# Patient Record
Sex: FEMALE | Race: WHITE | NOT HISPANIC OR LATINO | Employment: STUDENT | URBAN - METROPOLITAN AREA
[De-identification: names, ages, dates, MRNs, and addresses within clinical notes are randomized per-mention and may not be internally consistent; named-entity substitution may affect disease eponyms.]

---
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
## 2021-01-12 LAB
ABO GROUP BLD: NORMAL
D AG BLD QL: POSITIVE
EXTERNAL ANTIBODY SCREEN: NEGATIVE
EXTERNAL HEPATITIS B SURFACE ANTIGEN: NONREACTIVE
EXTERNAL HIV 1+2 AB/P24 AG SERUM: NONREACTIVE
EXTERNAL RUBELLA ANTIBODY, IGG: NORMAL

## 2021-05-25 LAB — EXTERNAL GROUP B STREP DNA: POSITIVE

## 2021-08-24 LAB
EXTERNAL CHLAMYDIA TRACHOMATIS DNA PROBE: POSITIVE
EXTERNAL NEISSERIA GONORRHOEAE DNA PROBE: POSITIVE

## 2021-09-06 ENCOUNTER — HOSPITAL ENCOUNTER (INPATIENT)
Facility: HOSPITAL | Age: 21
LOS: 2 days | Discharge: HOME | DRG: 776 | End: 2021-09-08
Attending: OBSTETRICS & GYNECOLOGY | Admitting: OBSTETRICS & GYNECOLOGY
Payer: COMMERCIAL

## 2021-09-06 DIAGNOSIS — Z3A.37 37 WEEKS GESTATION OF PREGNANCY: Primary | ICD-10-CM

## 2021-09-06 PROBLEM — B00.9 HERPES VIRUS INFECTION IN MOTHER DURING THIRD TRIMESTER OF PREGNANCY: Status: ACTIVE | Noted: 2021-09-06

## 2021-09-06 PROBLEM — O98.513 HERPES VIRUS INFECTION IN MOTHER DURING THIRD TRIMESTER OF PREGNANCY: Status: ACTIVE | Noted: 2021-09-06

## 2021-09-06 LAB
ABO GROUP (TYPE) IN BLOOD: NORMAL
ANTIBODY SCREEN: NORMAL
BASOPHILS # BLD AUTO: 0.1 10*3/UL (ref 0–0.1)
BASOPHILS NFR BLD AUTO: 0 % (ref 0–2)
D AG BLD QL: NORMAL
EOSINOPHIL # BLD AUTO: 0 10*3/UL (ref 0.1–0.2)
EOSINOPHIL NFR BLD AUTO: 0 % (ref 0–3)
ERYTHROCYTE [DISTWIDTH] IN BLOOD BY AUTOMATED COUNT: 16.5 % (ref 11.5–14)
ERYTHROCYTE [DISTWIDTH] IN BLOOD BY AUTOMATED COUNT: 16.5 % (ref 11.5–14)
HCT VFR BLD AUTO: 36.4 % (ref 34–45)
HCT VFR BLD AUTO: 36.7 % (ref 34–45)
HGB BLD-MCNC: 12 G/DL (ref 11.5–15.5)
HGB BLD-MCNC: 12.1 G/DL (ref 11.5–15.5)
LYMPHOCYTES # BLD AUTO: 2.2 10*3/UL (ref 1–3.2)
LYMPHOCYTES NFR BLD AUTO: 14 % (ref 11–47)
MCH RBC QN AUTO: 29.3 PG (ref 28–33)
MCH RBC QN AUTO: 29.5 PG (ref 28–33)
MCHC RBC AUTO-ENTMCNC: 33 G/DL (ref 32–36)
MCHC RBC AUTO-ENTMCNC: 33.1 G/DL (ref 32–36)
MCV RBC AUTO: 88.6 FL (ref 81–97)
MCV RBC AUTO: 89.2 FL (ref 81–97)
MONOCYTES # BLD AUTO: 0.5 10*3/UL (ref 0.2–0.8)
MONOCYTES NFR BLD AUTO: 3 % (ref 3–11)
NEUTROPHILS # BLD AUTO: 13.4 10*3/UL (ref 2–7.4)
NEUTROPHILS NFR BLD AUTO: 83 % (ref 41–81)
PLATELET # BLD AUTO: 267 10*3/UL (ref 140–350)
PLATELET # BLD AUTO: 270 10*3/UL (ref 140–350)
PMV BLD AUTO: 8.2 FL (ref 6.9–10.8)
PMV BLD AUTO: 9.2 FL (ref 6.9–10.8)
RBC # BLD AUTO: 4.08 10*6/ΜL (ref 3.7–5.3)
RBC # BLD AUTO: 4.14 10*6/ΜL (ref 3.7–5.3)
SECOND/CONFIRMATORY ABORH PERFORMED: NORMAL
WBC # BLD AUTO: 16.2 10*3/UL (ref 4.5–10.5)
WBC # BLD AUTO: 16.4 10*3/UL (ref 4.5–10.5)

## 2021-09-06 PROCEDURE — 85025 COMPLETE CBC W/AUTO DIFF WBC: CPT | Performed by: OBSTETRICS & GYNECOLOGY

## 2021-09-06 PROCEDURE — 36415 COLL VENOUS BLD VENIPUNCTURE: CPT | Performed by: OBSTETRICS & GYNECOLOGY

## 2021-09-06 PROCEDURE — (BLANK) HC ROOM PRIVATE OBSTETRICS

## 2021-09-06 PROCEDURE — 85027 COMPLETE CBC AUTOMATED: CPT | Performed by: OBSTETRICS & GYNECOLOGY

## 2021-09-06 PROCEDURE — 2590000100 HC RX 259: Performed by: OBSTETRICS & GYNECOLOGY

## 2021-09-06 PROCEDURE — 6370000100 HC RX 637 (ALT 250 FOR IP): Performed by: OBSTETRICS & GYNECOLOGY

## 2021-09-06 PROCEDURE — 86901 BLOOD TYPING SEROLOGIC RH(D): CPT

## 2021-09-06 RX ORDER — FOLIC ACID/MULTIVIT,IRON,MINER 0.4MG-18MG
1 TABLET ORAL DAILY
Status: DISCONTINUED | OUTPATIENT
Start: 2021-09-06 | End: 2021-09-08 | Stop reason: HOSPADM

## 2021-09-06 RX ORDER — CALCIUM CARBONATE 200(500)MG
750 TABLET,CHEWABLE ORAL 3 TIMES DAILY PRN
Status: DISCONTINUED | OUTPATIENT
Start: 2021-09-06 | End: 2021-09-08 | Stop reason: HOSPADM

## 2021-09-06 RX ORDER — SERTRALINE HYDROCHLORIDE 50 MG/1
50 TABLET, FILM COATED ORAL DAILY
Status: DISCONTINUED | OUTPATIENT
Start: 2021-09-06 | End: 2021-09-08 | Stop reason: HOSPADM

## 2021-09-06 RX ORDER — DOCUSATE SODIUM 100 MG/1
100 CAPSULE, LIQUID FILLED ORAL 2 TIMES DAILY
Status: DISCONTINUED | OUTPATIENT
Start: 2021-09-06 | End: 2021-09-08 | Stop reason: HOSPADM

## 2021-09-06 RX ORDER — VALACYCLOVIR HYDROCHLORIDE 500 MG/1
500 TABLET, FILM COATED ORAL 2 TIMES DAILY
Status: DISCONTINUED | OUTPATIENT
Start: 2021-09-06 | End: 2021-09-08 | Stop reason: HOSPADM

## 2021-09-06 RX ORDER — ACETAMINOPHEN 500 MG
500-1000 TABLET ORAL EVERY 4 HOURS PRN
Status: DISCONTINUED | OUTPATIENT
Start: 2021-09-06 | End: 2021-09-08 | Stop reason: HOSPADM

## 2021-09-06 RX ORDER — SERTRALINE HYDROCHLORIDE 50 MG/1
50 TABLET, FILM COATED ORAL DAILY
COMMUNITY

## 2021-09-06 RX ORDER — VALACYCLOVIR HYDROCHLORIDE 500 MG/1
500 TABLET, FILM COATED ORAL 2 TIMES DAILY
COMMUNITY

## 2021-09-06 RX ORDER — IBUPROFEN 800 MG/1
800 TABLET ORAL EVERY 8 HOURS SCHEDULED
Status: DISCONTINUED | OUTPATIENT
Start: 2021-09-06 | End: 2021-09-08 | Stop reason: HOSPADM

## 2021-09-06 RX ADMIN — SERTRALINE 50 MG: 50 TABLET, FILM COATED ORAL at 15:13

## 2021-09-06 RX ADMIN — IBUPROFEN 800 MG: 800 TABLET, FILM COATED ORAL at 15:13

## 2021-09-06 RX ADMIN — DOCUSATE SODIUM 100 MG: 100 CAPSULE, LIQUID FILLED ORAL at 15:13

## 2021-09-06 RX ADMIN — Medication 1 TABLET: at 15:13

## 2021-09-06 RX ADMIN — IBUPROFEN 800 MG: 800 TABLET, FILM COATED ORAL at 23:20

## 2021-09-06 RX ADMIN — VALACYCLOVIR HYDROCHLORIDE 500 MG: 500 TABLET, FILM COATED ORAL at 15:12

## 2021-09-06 RX ADMIN — DOCUSATE SODIUM 100 MG: 100 CAPSULE, LIQUID FILLED ORAL at 23:20

## 2021-09-07 PROBLEM — A54.9: Status: ACTIVE | Noted: 2021-09-07

## 2021-09-07 PROBLEM — A74.9 CHLAMYDIA: Status: ACTIVE | Noted: 2021-09-07

## 2021-09-07 LAB
BASOPHILS # BLD AUTO: 0.1 10*3/UL (ref 0–0.1)
BASOPHILS NFR BLD AUTO: 1 % (ref 0–2)
C TRACH DNA SPEC QL NAA+PROBE: POSITIVE
EOSINOPHIL # BLD AUTO: 0.1 10*3/UL (ref 0.1–0.2)
EOSINOPHIL NFR BLD AUTO: 1 % (ref 0–3)
ERYTHROCYTE [DISTWIDTH] IN BLOOD BY AUTOMATED COUNT: 16.5 % (ref 11.5–14)
HCT VFR BLD AUTO: 33.3 % (ref 34–45)
HGB BLD-MCNC: 11.1 G/DL (ref 11.5–15.5)
LYMPHOCYTES # BLD AUTO: 3.2 10*3/UL (ref 1–3.2)
LYMPHOCYTES NFR BLD AUTO: 25 % (ref 11–47)
MCH RBC QN AUTO: 29.5 PG (ref 28–33)
MCHC RBC AUTO-ENTMCNC: 33.3 G/DL (ref 32–36)
MCV RBC AUTO: 88.8 FL (ref 81–97)
MONOCYTES # BLD AUTO: 0.7 10*3/UL (ref 0.2–0.8)
MONOCYTES NFR BLD AUTO: 5 % (ref 3–11)
N GONORRHOEA DNA SPEC QL NAA+PROBE: POSITIVE
NEUTROPHILS # BLD AUTO: 8.7 10*3/UL (ref 2–7.4)
NEUTROPHILS NFR BLD AUTO: 68 % (ref 41–81)
PLATELET # BLD AUTO: 236 10*3/UL (ref 140–350)
PMV BLD AUTO: 8.2 FL (ref 6.9–10.8)
RBC # BLD AUTO: 3.74 10*6/ΜL (ref 3.7–5.3)
WBC # BLD AUTO: 12.8 10*3/UL (ref 4.5–10.5)

## 2021-09-07 PROCEDURE — 6370000100 HC RX 637 (ALT 250 FOR IP): Performed by: OBSTETRICS & GYNECOLOGY

## 2021-09-07 PROCEDURE — 87591 N.GONORRHOEAE DNA AMP PROB: CPT | Performed by: OBSTETRICS & GYNECOLOGY

## 2021-09-07 PROCEDURE — (BLANK) HC ROOM PRIVATE OBSTETRICS

## 2021-09-07 PROCEDURE — 87491 CHLMYD TRACH DNA AMP PROBE: CPT | Performed by: OBSTETRICS & GYNECOLOGY

## 2021-09-07 PROCEDURE — 2590000100 HC RX 259: Performed by: OBSTETRICS & GYNECOLOGY

## 2021-09-07 PROCEDURE — 6360000200 HC RX 636 W HCPCS (ALT 250 FOR IP): Performed by: OBSTETRICS & GYNECOLOGY

## 2021-09-07 PROCEDURE — 85025 COMPLETE CBC W/AUTO DIFF WBC: CPT | Performed by: OBSTETRICS & GYNECOLOGY

## 2021-09-07 RX ORDER — AZITHROMYCIN 250 MG/1
1000 TABLET, FILM COATED ORAL DAILY
Status: DISCONTINUED | OUTPATIENT
Start: 2021-09-07 | End: 2021-09-08 | Stop reason: HOSPADM

## 2021-09-07 RX ADMIN — AZITHROMYCIN MONOHYDRATE 1000 MG: 250 TABLET ORAL at 12:33

## 2021-09-07 RX ADMIN — DOCUSATE SODIUM 100 MG: 100 CAPSULE, LIQUID FILLED ORAL at 09:00

## 2021-09-07 RX ADMIN — DOCUSATE SODIUM 100 MG: 100 CAPSULE, LIQUID FILLED ORAL at 20:09

## 2021-09-07 RX ADMIN — IBUPROFEN 800 MG: 800 TABLET, FILM COATED ORAL at 17:22

## 2021-09-07 RX ADMIN — IBUPROFEN 800 MG: 800 TABLET, FILM COATED ORAL at 08:32

## 2021-09-07 RX ADMIN — VALACYCLOVIR HYDROCHLORIDE 500 MG: 500 TABLET, FILM COATED ORAL at 20:09

## 2021-09-07 RX ADMIN — SERTRALINE 50 MG: 50 TABLET, FILM COATED ORAL at 08:32

## 2021-09-07 RX ADMIN — CEFTRIAXONE SODIUM 250 MG: 1 INJECTION, POWDER, FOR SOLUTION INTRAMUSCULAR; INTRAVENOUS at 12:35

## 2021-09-07 RX ADMIN — Medication 1 TABLET: at 08:32

## 2021-09-07 NOTE — PLAN OF CARE
Problem: Pain - Adult  Goal: Verbalizes/displays adequate comfort level or baseline comfort level  Description: INTERVENTIONS:  1. Encourage patient to monitor pain and request interventions  2. Assess pain using the appropriate pain scale  3. Administer analgesics based on type and severity of pain and evaluate response  4. Educate/Implement non-pharmacological measures as appropriate and evaluate response  5. Consider cultural, developmental and social influences on pain and pain management  6. Notify Provider if interventions unsuccessful or patient reports new pain  9/6/2021 1952 by Charles Bryan RN  Outcome: Progressing  Flowsheets (Taken 9/6/2021 1821 by Samara Cain RN)  Verbalizes/displays adequate comfort level or baseline comfort level:   Encourage patient to monitor pain and request interventions   Assess pain using the appropriate pain scale   Administer analgesics based on type and severity of pain and evaluate response   Educate/Implement non-pharmacological measures as appropriate and evaluate response  9/6/2021 1952 by Charles Bryan RN  Outcome: Progressing     Problem: Infection - Adult  Goal: Absence of infection during hospitalization  Description: INTERVENTIONS:  1. Assess and monitor for signs and symptoms of infection  2. Monitor lab/diagnostic results  3. Monitor all insertion sites/wounds/incisions  4. Monitor secretions for changes in amount and color  5. Administer medications as ordered  6. Educate and encourage patient and family to use good hand hygiene technique  7. Identify and educate in appropriate isolation precautions for identified infection/condition  9/6/2021 1952 by Charles Bryan RN  Outcome: Progressing  Flowsheets (Taken 9/6/2021 1821 by Samara Cain RN)  Absence of infection during hospitalization:   Assess and monitor for signs and symptoms of infection   Monitor lab/diagnostic results   Monitor all insertion sites/wounds/incisions  9/6/2021 1952 by Charles  SHANNON Bryan  Outcome: Progressing     Problem: Safety Adult - Fall  Goal: Free from fall injury  Description: INTERVENTIONS:    Inpatient - Please reference Cares/Safety Flowsheet under Linda Fall Risk for interventions.  Pediatrics - Please reference Peds Daily Cares/Safety Flowsheet under Franco Pediatric Fall Assessment Fall Bundle for interventions  LD/OB - Please reference OB Shift Screening Flowsheet under OB Fall Risk for interventions.  9/6/2021 1952 by Charles Bryan RN  Outcome: Progressing  9/6/2021 1952 by Charles Bryan RN  Outcome: Progressing     Problem: Discharge Planning  Goal: Discharge to home or other facility with appropriate resources  Description: INTERVENTIONS:  1. Identify and discuss barriers to discharge with patient and caregiver.  2. Arrange for needed discharge resources and transportation as appropriate.  3. Identify discharge learning needs (meds, wound care, etc).  4. Arrange for interpreters to assist at discharge as needed.  5. Refer to  for coordinating discharge planning if the patient needs post-hospital services based on physician order or complex needs related to functional status, cognitive ability or social support system.  9/6/2021 1952 by Charles Bryan RN  Outcome: Progressing  Flowsheets (Taken 9/6/2021 1821 by Samara Cain RN)  Discharge to home or other facility with appropriate resources: Identify and discuss barriers to discharge with patient and caregiver  9/6/2021 1952 by Charles Bryan RN  Outcome: Progressing     Problem: Alteration in the Breast  Description: i.e. Inverted or flat nipples, breast augmentation, breast reduction, etc.  Goal: Optimize infant feeding at the breast  Description: INTERVENTIONS:  1. Maximize feeding opportunities (skin to skin)  2. Assess postioning, latch, gestational age and oral anatomy  3. Assess prior breastfeeding history  4. Discourage use of pacifiers - artifical nipples  5. Educate mother on  feeding cues and signs of proper latch  6. Provide latch assistance  7. Finger feeding/hand expression of EBM or sucrose to elicit suck  8. Symphony/[place] pump use  9. Order lactation consult  10. Nipple shield  11. Supplementation (EBM/formula) via finger feed  12. Supplementation (EBM/formula) via bottle  13. Supplementation (EBM/formula) via SNS  14. Treat damaged nipples  15. Provide and reinforce breastfeeding education  9/6/2021 1952 by Charles Bryan RN  Outcome: Progressing  Flowsheets (Taken 9/6/2021 1821 by Samara Cain RN)  Optimize infant feeding at the breast:   Maximize feeding opportunities (skin to skin)   Assess prior breastfeeding history   Educate mother on feeding cues and signs of proper latch   Order lactation consult   Discourage use of pacifiers - artificial nipples   Assess positioning,latch,gestational age and oral anatomy  9/6/2021 1952 by Charles Bryan RN  Outcome: Progressing     Problem: Inadequate Latch, Suck, or Swallow  Goal: Optimize infant feeding at the breast  Description: INTERVENTIONS:  1. Maximize feeding opportunities (skin to skin)  2. Assess postioning, latch, gestational age and oral anatomy  3. Assess prior breastfeeding history  4. Discourage use of pacifiers - artifical nipples  5. Educate mother on feeding cues and signs of proper latch  6. Provide latch assistance  7. Finger feeding/hand expression of EBM or sucrose to elicit suck  8. Symphony/[place] pump use  9. Order lactation consult  10. Nipple shield  11. Supplementation (EBM/formula) via finger feed  12. Supplementation (EBM/formula) via bottle  13. Supplementation (EBM/formula) via SNS  14. Treat damaged nipples  15. Provide and reinforce breastfeeding education  9/6/2021 1952 by Charles Bryan RN  Outcome: Progressing  Flowsheets (Taken 9/6/2021 1821 by Samara Cain RN)  Optimize infant feeding at the breast:   Maximize feeding opportunities (skin to skin)   Assess prior breastfeeding  history   Educate mother on feeding cues and signs of proper latch   Order lactation consult   Discourage use of pacifiers - artificial nipples   Assess positioning,latch,gestational age and oral anatomy  9/6/2021 1952 by Charles Bryan RN  Outcome: Progressing     Problem: Lactation Knowledge Deficit  Goal: Optimize infant feeding at the breast  Description: INTERVENTIONS:  1. Maximize feeding opportunities (skin to skin)  2. Assess postioning, latch, gestational age and oral anatomy  3. Assess prior breastfeeding history  4. Discourage use of pacifiers - artifical nipples  5. Educate mother on feeding cues and signs of proper latch  6. Provide latch assistance  7. Finger feeding/hand expression of EBM or sucrose to elicit suck  8. Symphony/[place] pump use  9. Order lactation consult  10. Nipple shield  11. Supplementation (EBM/formula) via finger feed  12. Supplementation (EBM/formula) via bottle  13. Supplementation (EBM/formula) via SNS  14. Treat damaged nipples  15. Provide and reinforce breastfeeding education  9/6/2021 1952 by Charles Bryan RN  Outcome: Progressing  Flowsheets (Taken 9/6/2021 1821 by Samara Cain RN)  Optimize infant feeding at the breast:   Maximize feeding opportunities (skin to skin)   Assess prior breastfeeding history   Educate mother on feeding cues and signs of proper latch   Order lactation consult   Discourage use of pacifiers - artificial nipples   Assess positioning,latch,gestational age and oral anatomy  9/6/2021 1952 by Charles Bryan RN  Outcome: Progressing     Problem: Postpartum  Goal: Experiences normal postpartum course  Description: INTERVENTIONS:  1. Monitor maternal vital signs  2. Assess uterine involution  9/6/2021 1952 by Charles Bryan RN  Outcome: Progressing  Flowsheets (Taken 9/6/2021 1952)  Experiences normal postpartum course:   Monitor maternal vital signs   Assess uterine involution  9/6/2021 1952 by Charles Bryan RN  Outcome:  Progressing  Goal: Appropriate maternal -  bonding  Description: INTERVENTIONS:  1. Identify family support  2. Referral to  or  as needed  2021 by Charles Bryan RN  Outcome: Progressing  Flowsheets (Taken 2021)  Appropriate maternal- bonding:   Identify family support   Referral to  or  as needed  2021 by Charles Bryan RN  Outcome: Progressing  Goal: Establishment of infant feeding pattern  Description: INTERVENTIONS:  1. Assess breast/bottle feeding  2. Refer to lactation as needed  2021 by Charles Bryan RN  Outcome: Progressing  Flowsheets (Taken 2021)  Establishment of infant feeding pattern:   Assess breast/bottle feeding   Refer to lactation as needed  2021 by Charles Bryan RN  Outcome: Progressing  Goal: Incisions, wounds, or drain sites healing without S/S of infection  Description: INTERVENTIONS  1. Assess and document risk factors for skin breakdown  2. Assess and document skin integrity  3. Assess and document dressing/incision, wound bed, drain sites and surrounding tissue  4. Implement wound care per orders  2021 by Charles Bryan RN  Outcome: Progressing  Flowsheets (Taken 2021)  Incision(s), wound(s) or drain site(s) healing without S/S of infection:   Assess and document risk factors for skin breakdown   Assess and document skin integrity   Assess and document dressing/incision, wound bed, drain sites and surrounding tissue   Implement wound care per orders  2021 by Charles Bryan RN  Outcome: Progressing

## 2021-09-07 NOTE — PLAN OF CARE
Problem: Pain - Adult  Goal: Verbalizes/displays adequate comfort level or baseline comfort level  Description: INTERVENTIONS:  1. Encourage patient to monitor pain and request interventions  2. Assess pain using the appropriate pain scale  3. Administer analgesics based on type and severity of pain and evaluate response  4. Educate/Implement non-pharmacological measures as appropriate and evaluate response  5. Consider cultural, developmental and social influences on pain and pain management  6. Notify Provider if interventions unsuccessful or patient reports new pain  Outcome: Progressing  Flowsheets (Taken 9/7/2021 0932)  Verbalizes/displays adequate comfort level or baseline comfort level:   Encourage patient to monitor pain and request interventions   Assess pain using the appropriate pain scale   Administer analgesics based on type and severity of pain and evaluate response   Educate/Implement non-pharmacological measures as appropriate and evaluate response   Consider cultural, developmental and social influences on pain and pain management   Notify Provider if interventions unsuccessful or patient reports new pain     Problem: Infection - Adult  Goal: Absence of infection during hospitalization  Description: INTERVENTIONS:  1. Assess and monitor for signs and symptoms of infection  2. Monitor lab/diagnostic results  3. Monitor all insertion sites/wounds/incisions  4. Monitor secretions for changes in amount and color  5. Administer medications as ordered  6. Educate and encourage patient and family to use good hand hygiene technique  7. Identify and educate in appropriate isolation precautions for identified infection/condition  Outcome: Progressing  Flowsheets (Taken 9/7/2021 0932)  Absence of infection during hospitalization:   Assess and monitor for signs and symptoms of infection   Educate and encourage patient and family to use good hand hygiene technique     Problem: Safety Adult - Fall  Goal: Free  from fall injury  Description: INTERVENTIONS:    Inpatient - Please reference Cares/Safety Flowsheet under Linda Fall Risk for interventions.  Pediatrics - Please reference Peds Daily Cares/Safety Flowsheet under [facility] Pediatric Fall Assessment Fall Bundle for interventions  LD/OB - Please reference OB Shift Screening Flowsheet under OB Fall Risk for interventions.  Outcome: Progressing     Problem: Discharge Planning  Goal: Discharge to home or other facility with appropriate resources  Description: INTERVENTIONS:  1. Identify and discuss barriers to discharge with patient and caregiver.  2. Arrange for needed discharge resources and transportation as appropriate.  3. Identify discharge learning needs (meds, wound care, etc).  4. Arrange for interpreters to assist at discharge as needed.  5. Refer to  for coordinating discharge planning if the patient needs post-hospital services based on physician order or complex needs related to functional status, cognitive ability or social support system.  Outcome: Progressing  Flowsheets (Taken 9/7/2021 9357)  Discharge to home or other facility with appropriate resources: Identify and discuss barriers to discharge with patient and caregiver     Problem: Alteration in the Breast  Description: i.e. Inverted or flat nipples, breast augmentation, breast reduction, etc.  Goal: Optimize infant feeding at the breast  Description: INTERVENTIONS:  1. Maximize feeding opportunities (skin to skin)  2. Assess postioning, latch, gestational age and oral anatomy  3. Assess prior breastfeeding history  4. Discourage use of pacifiers - artifical nipples  5. Educate mother on feeding cues and signs of proper latch  6. Provide latch assistance  7. Finger feeding/hand expression of EBM or sucrose to elicit suck  8. Symphony/[place] pump use  9. Order lactation consult  10. Nipple shield  11. Supplementation (EBM/formula) via finger feed  12. Supplementation (EBM/formula) via  bottle  13. Supplementation (EBM/formula) via SNS  14. Treat damaged nipples  15. Provide and reinforce breastfeeding education  Outcome: Progressing  Flowsheets (Taken 9/7/2021 0932)  Optimize infant feeding at the breast:   Maximize feeding opportunities (skin to skin)   Provide and reinforce breastfeeding education   Order lactation consult     Problem: Inadequate Latch, Suck, or Swallow  Goal: Optimize infant feeding at the breast  Description: INTERVENTIONS:  1. Maximize feeding opportunities (skin to skin)  2. Assess postioning, latch, gestational age and oral anatomy  3. Assess prior breastfeeding history  4. Discourage use of pacifiers - artifical nipples  5. Educate mother on feeding cues and signs of proper latch  6. Provide latch assistance  7. Finger feeding/hand expression of EBM or sucrose to elicit suck  8. Symphony/[place] pump use  9. Order lactation consult  10. Nipple shield  11. Supplementation (EBM/formula) via finger feed  12. Supplementation (EBM/formula) via bottle  13. Supplementation (EBM/formula) via SNS  14. Treat damaged nipples  15. Provide and reinforce breastfeeding education  Outcome: Progressing  Flowsheets (Taken 9/7/2021 0932)  Optimize infant feeding at the breast:   Maximize feeding opportunities (skin to skin)   Provide and reinforce breastfeeding education   Order lactation consult     Problem: Lactation Knowledge Deficit  Goal: Optimize infant feeding at the breast  Description: INTERVENTIONS:  1. Maximize feeding opportunities (skin to skin)  2. Assess postioning, latch, gestational age and oral anatomy  3. Assess prior breastfeeding history  4. Discourage use of pacifiers - artifical nipples  5. Educate mother on feeding cues and signs of proper latch  6. Provide latch assistance  7. Finger feeding/hand expression of EBM or sucrose to elicit suck  8. Symphony/[place] pump use  9. Order lactation consult  10. Nipple shield  11. Supplementation (EBM/formula) via finger  feed  12. Supplementation (EBM/formula) via bottle  13. Supplementation (EBM/formula) via SNS  14. Treat damaged nipples  15. Provide and reinforce breastfeeding education  Outcome: Progressing  Flowsheets (Taken 2021)  Optimize infant feeding at the breast:   Maximize feeding opportunities (skin to skin)   Provide and reinforce breastfeeding education   Order lactation consult     Problem: Postpartum  Goal: Experiences normal postpartum course  Description: INTERVENTIONS:  1. Monitor maternal vital signs  2. Assess uterine involution  Outcome: Progressing  Flowsheets (Taken 2021)  Experiences normal postpartum course:   Monitor maternal vital signs   Assess uterine involution  Goal: Appropriate maternal -  bonding  Description: INTERVENTIONS:  1. Identify family support  2. Referral to  or  as needed  Outcome: Progressing  Flowsheets (Taken 2021)  Appropriate maternal- bonding: Identify family support  Goal: Establishment of infant feeding pattern  Description: INTERVENTIONS:  1. Assess breast/bottle feeding  2. Refer to lactation as needed  Outcome: Progressing  Flowsheets (Taken 2021)  Establishment of infant feeding pattern:   Assess breast/bottle feeding   Refer to lactation as needed  Goal: Incisions, wounds, or drain sites healing without S/S of infection  Description: INTERVENTIONS  1. Assess and document risk factors for skin breakdown  2. Assess and document skin integrity  3. Assess and document dressing/incision, wound bed, drain sites and surrounding tissue  4. Implement wound care per orders  Outcome: Progressing  Flowsheets (Taken 2021)  Incision(s), wound(s) or drain site(s) healing without S/S of infection:   Assess and document risk factors for skin breakdown   Assess and document skin integrity

## 2021-09-07 NOTE — PROGRESS NOTES
Postpartum Progress Note    Date of Service: 21    Subjective   Patient is doing well today. Lochia is minimal. Patient is tolerating a regular diet. She is ambulating. Pain is well controlled. Patient is passing flatus and has not had a BM. Patient is urinating without difficulty. She is breast feeding well. Patient had positive GC and chlamydia on 21 that was untreated by her clinic.      Objective   Vitals:    21 0512   BP: 123/66   Pulse: 53   Resp: 16   Temp: 36.6 °C (97.9 °F)   SpO2: 97%     Physical Exam    General:  Alert, cooperative, no distress   Abdomen: Bowel sounds present. Soft, appropriate fundal tenderness, non-distended. Fundus firm at umbilicus. Incision: not applicable, (vaginal delivery).    Pelvic Deferred   Extremities: Negative Christina's sign bilaterally.  Edema tr.  DTRs normal.     Labs  Recent Results (from the past 24 hour(s))   CBC w/auto differential Blood, Venous    Collection Time: 21  5:15 AM   Result Value Ref Range    WBC 12.8 (H) 4.5 - 10.5 10*3/uL    RBC 3.74 3.70 - 5.30 10*6/µL    Hemoglobin 11.1 (L) 11.5 - 15.5 g/dL    Hematocrit 33.3 (L) 34.0 - 45.0 %    MCV 88.8 81.0 - 97.0 fL    MCH 29.5 28.0 - 33.0 pg    MCHC 33.3 32.0 - 36.0 g/dL    RDW 16.5 (H) 11.5 - 14.0 %    Platelets 236 140 - 350 10*3/uL    MPV 8.2 6.9 - 10.8 fL    Neutrophils% 68 41 - 81 %    Lymphocytes% 25 11 - 47 %    Monocytes% 5 3 - 11 %    Eosinophils% 1 0 - 3 %    Basophils% 1 0 - 2 %    ANC (auto diff) 8.70 (H) 2.00 - 7.40 10*3/UL    Lymphocytes Absolute 3.20 1.00 - 3.20 10*3/uL    Monocytes Absolute 0.70 0.20 - 0.80 10*3/uL    Eosinophils Absolute 0.10 0.10 - 0.20 10*3/uL    Basophils Absolute 0.10 0.00 - 0.10 10*3/uL       Assessment/Plan     Marisa Gates is a 20 y.o.  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 Routine postpartum care.  Dirty urine testing today requested by pediatrics.  Will treat GC and chlamydia with Rocephin 250 mg IM and Zithromax 1 gm po today.  Anticipate discharge tomorrow.      ______________________________  Martin Bailey MD  09/07/21 11:07 AM

## 2021-09-07 NOTE — LACTATION NOTE
This note was copied from a baby's chart.  Lactation Consult    Reason for Consult: Initial assessment    Mother's Name: Marisa Gates     SUBJECTIVE/OBJECTIVE  : 2021  Gestational Age: 37w1d SGA infant.   Feeding Narrative: Mother states infant is sleepy at breast. She states she is feeding when baby is rousing    Type of delivery:     Birth weight 6 lb 0.1 oz (2723 g)   Weight change since birth Pct Wt Change: -7.6 %   Current/Previous Weight Patient Weights for the past 8760 hrs (Last 2 readings):   Weight   21 0554 2516 g   21 0510 2723 g      Weight Change  Since Last Visit: -207 g       Maternal history includes:  19 y.o.      Patient Active Problem List    Diagnosis Date Noted   • GCI (gonococcal infection) 2021   • Chlamydia 2021   • Postpartum care and examination immediately after delivery 2021   • 37 weeks gestation of pregnancy 2021   • Herpes virus infection in mother during third trimester of pregnancy 2021   • MDD (major depressive disorder), recurrent episode, severe (CMS/HCC) ([facility]) 2019   • Abnormal results of thyroid function studies 2019   • Hypokalemia 2019   • Hypomagnesemia 2019   • Periorbital ecchymosis, left, initial encounter 2019   • Suicidal ideation 2019   • Intentional drug overdose (CMS/[facility]) ([facility]) 2019   • Depression       Past Medical History:   Diagnosis Date   • Depression        Past Surgical History:   Procedure Laterality Date   • MOUTH SURGERY        Social History     Tobacco Use   • Smoking status: Former Smoker   • Smokeless tobacco: Former User   Substance and Sexual Activity   • Alcohol use: Not Currently   • Drug use: Yes     Types: Marijuana   • Sexual activity: Yes   Social History Narrative   • Not on file      Has mother  before?: No            ASSESSMENT  Left Breast: WDL Right Breast: WDL Left Nipple: WDL Right Nipple: WDL          Pre-Consult Assessment   Feeding  Frequency: enc Q 2-3hrs  Latch Assessment: Unsustained  Latch Pain: 0    Post-Consult Assessment  Feeding Duration: 25min  Latch Assessment: Correct, Audible Swallowing, Sustained (mother using breast compressions to encourage infant to continue feeding)  Latch Pain: 0  Supplementation: No  Interventions: Wake Sleepy Baby, Skin-to-Skin, EBM to nipple  Post Consult Observations: Mother needs encouragement to wake baby Q 2-3 hrs for feeds. Mother demonstrated correct cross-cradle technique to L breast       PLAN  Mother plans to continue BF infant. Mother encouraged to wake baby Q 2-3hrs to feed. Mother positioned infant in cross cradle to L breast with minimal adjustments by LC. Mother states she will call LCS PRN.  -----------------------------------------------  Edilia Crouch RN  09/07/21 3:09 PM

## 2021-09-07 NOTE — PLAN OF CARE
Problem: Pain - Adult  Goal: Verbalizes/displays adequate comfort level or baseline comfort level  Description: INTERVENTIONS:  1. Encourage patient to monitor pain and request interventions  2. Assess pain using the appropriate pain scale  3. Administer analgesics based on type and severity of pain and evaluate response  4. Educate/Implement non-pharmacological measures as appropriate and evaluate response  5. Consider cultural, developmental and social influences on pain and pain management  6. Notify Provider if interventions unsuccessful or patient reports new pain  Flowsheets (Taken 9/6/2021 1821)  Verbalizes/displays adequate comfort level or baseline comfort level:   Encourage patient to monitor pain and request interventions   Assess pain using the appropriate pain scale   Administer analgesics based on type and severity of pain and evaluate response   Educate/Implement non-pharmacological measures as appropriate and evaluate response     Problem: Infection - Adult  Goal: Absence of infection during hospitalization  Description: INTERVENTIONS:  1. Assess and monitor for signs and symptoms of infection  2. Monitor lab/diagnostic results  3. Monitor all insertion sites/wounds/incisions  4. Monitor secretions for changes in amount and color  5. Administer medications as ordered  6. Educate and encourage patient and family to use good hand hygiene technique  7. Identify and educate in appropriate isolation precautions for identified infection/condition  Flowsheets (Taken 9/6/2021 1821)  Absence of infection during hospitalization:   Assess and monitor for signs and symptoms of infection   Monitor lab/diagnostic results   Monitor all insertion sites/wounds/incisions     Problem: Discharge Planning  Goal: Discharge to home or other facility with appropriate resources  Description: INTERVENTIONS:  1. Identify and discuss barriers to discharge with patient and caregiver.  2. Arrange for needed discharge resources  and transportation as appropriate.  3. Identify discharge learning needs (meds, wound care, etc).  4. Arrange for interpreters to assist at discharge as needed.  5. Refer to  for coordinating discharge planning if the patient needs post-hospital services based on physician order or complex needs related to functional status, cognitive ability or social support system.  Flowsheets (Taken 9/6/2021 1821)  Discharge to home or other facility with appropriate resources: Identify and discuss barriers to discharge with patient and caregiver     Problem: Alteration in the Breast  Description: i.e. Inverted or flat nipples, breast augmentation, breast reduction, etc.  Goal: Optimize infant feeding at the breast  Description: INTERVENTIONS:  1. Maximize feeding opportunities (skin to skin)  2. Assess postioning, latch, gestational age and oral anatomy  3. Assess prior breastfeeding history  4. Discourage use of pacifiers - artifical nipples  5. Educate mother on feeding cues and signs of proper latch  6. Provide latch assistance  7. Finger feeding/hand expression of EBM or sucrose to elicit suck  8. Symphony/[place] pump use  9. Order lactation consult  10. Nipple shield  11. Supplementation (EBM/formula) via finger feed  12. Supplementation (EBM/formula) via bottle  13. Supplementation (EBM/formula) via SNS  14. Treat damaged nipples  15. Provide and reinforce breastfeeding education  Flowsheets (Taken 9/6/2021 1821)  Optimize infant feeding at the breast:   Maximize feeding opportunities (skin to skin)   Assess prior breastfeeding history   Educate mother on feeding cues and signs of proper latch   Order lactation consult   Discourage use of pacifiers - artificial nipples   Assess positioning,latch,gestational age and oral anatomy     Problem: Inadequate Latch, Suck, or Swallow  Goal: Optimize infant feeding at the breast  Description: INTERVENTIONS:  1. Maximize feeding opportunities (skin to skin)  2.  Assess postioning, latch, gestational age and oral anatomy  3. Assess prior breastfeeding history  4. Discourage use of pacifiers - artifical nipples  5. Educate mother on feeding cues and signs of proper latch  6. Provide latch assistance  7. Finger feeding/hand expression of EBM or sucrose to elicit suck  8. Symphony/[place] pump use  9. Order lactation consult  10. Nipple shield  11. Supplementation (EBM/formula) via finger feed  12. Supplementation (EBM/formula) via bottle  13. Supplementation (EBM/formula) via SNS  14. Treat damaged nipples  15. Provide and reinforce breastfeeding education  Flowsheets (Taken 9/6/2021 1821)  Optimize infant feeding at the breast:   Maximize feeding opportunities (skin to skin)   Assess prior breastfeeding history   Educate mother on feeding cues and signs of proper latch   Order lactation consult   Discourage use of pacifiers - artificial nipples   Assess positioning,latch,gestational age and oral anatomy     Problem: Lactation Knowledge Deficit  Goal: Optimize infant feeding at the breast  Description: INTERVENTIONS:  1. Maximize feeding opportunities (skin to skin)  2. Assess postioning, latch, gestational age and oral anatomy  3. Assess prior breastfeeding history  4. Discourage use of pacifiers - artifical nipples  5. Educate mother on feeding cues and signs of proper latch  6. Provide latch assistance  7. Finger feeding/hand expression of EBM or sucrose to elicit suck  8. Symphony/[place] pump use  9. Order lactation consult  10. Nipple shield  11. Supplementation (EBM/formula) via finger feed  12. Supplementation (EBM/formula) via bottle  13. Supplementation (EBM/formula) via SNS  14. Treat damaged nipples  15. Provide and reinforce breastfeeding education  Flowsheets (Taken 9/6/2021 1821)  Optimize infant feeding at the breast:   Maximize feeding opportunities (skin to skin)   Assess prior breastfeeding history   Educate mother on feeding cues and signs of proper latch    Order lactation consult   Discourage use of pacifiers - artificial nipples   Assess positioning,latch,gestational age and oral anatomy

## 2021-09-08 VITALS
OXYGEN SATURATION: 98 % | DIASTOLIC BLOOD PRESSURE: 76 MMHG | TEMPERATURE: 97.3 F | HEART RATE: 58 BPM | SYSTOLIC BLOOD PRESSURE: 119 MMHG | RESPIRATION RATE: 18 BRPM

## 2021-09-08 PROCEDURE — 90472 IMMUNIZATION ADMIN EACH ADD: CPT | Performed by: OBSTETRICS & GYNECOLOGY

## 2021-09-08 PROCEDURE — 6370000100 HC RX 637 (ALT 250 FOR IP): Performed by: OBSTETRICS & GYNECOLOGY

## 2021-09-08 PROCEDURE — 90471 IMMUNIZATION ADMIN: CPT | Performed by: OBSTETRICS & GYNECOLOGY

## 2021-09-08 PROCEDURE — 6360000200 HC RX 636 W HCPCS (ALT 250 FOR IP): Performed by: OBSTETRICS & GYNECOLOGY

## 2021-09-08 PROCEDURE — 90715 TDAP VACCINE 7 YRS/> IM: CPT | Performed by: OBSTETRICS & GYNECOLOGY

## 2021-09-08 PROCEDURE — 90707 MMR VACCINE SC: CPT | Performed by: OBSTETRICS & GYNECOLOGY

## 2021-09-08 PROCEDURE — 2590000100 HC RX 259: Performed by: OBSTETRICS & GYNECOLOGY

## 2021-09-08 RX ORDER — IBUPROFEN 800 MG/1
800 TABLET ORAL EVERY 8 HOURS PRN
Qty: 30 TABLET | Refills: 0 | Status: SHIPPED | OUTPATIENT
Start: 2021-09-08 | End: 2021-09-18

## 2021-09-08 RX ADMIN — IBUPROFEN 800 MG: 800 TABLET, FILM COATED ORAL at 04:33

## 2021-09-08 RX ADMIN — TETANUS TOXOID, REDUCED DIPHTHERIA TOXOID AND ACELLULAR PERTUSSIS VACCINE, ADSORBED 0.5 ML: 5; 2.5; 8; 8; 2.5 SUSPENSION INTRAMUSCULAR at 11:52

## 2021-09-08 RX ADMIN — SERTRALINE 50 MG: 50 TABLET, FILM COATED ORAL at 08:08

## 2021-09-08 RX ADMIN — DOCUSATE SODIUM 100 MG: 100 CAPSULE, LIQUID FILLED ORAL at 08:08

## 2021-09-08 RX ADMIN — Medication 1 TABLET: at 08:08

## 2021-09-08 RX ADMIN — MEASLES, MUMPS, AND RUBELLA VIRUS VACCINE LIVE 0.5 ML: 1000; 12500; 1000 INJECTION, POWDER, LYOPHILIZED, FOR SUSPENSION SUBCUTANEOUS at 11:55

## 2021-09-08 NOTE — PLAN OF CARE
Problem: Pain - Adult  Goal: Verbalizes/displays adequate comfort level or baseline comfort level  Description: INTERVENTIONS:  1. Encourage patient to monitor pain and request interventions  2. Assess pain using the appropriate pain scale  3. Administer analgesics based on type and severity of pain and evaluate response  4. Educate/Implement non-pharmacological measures as appropriate and evaluate response  5. Consider cultural, developmental and social influences on pain and pain management  6. Notify Provider if interventions unsuccessful or patient reports new pain  Outcome: Adequate for Discharge  Flowsheets (Taken 9/8/2021 0956)  Verbalizes/displays adequate comfort level or baseline comfort level:   Encourage patient to monitor pain and request interventions   Assess pain using the appropriate pain scale   Administer analgesics based on type and severity of pain and evaluate response   Educate/Implement non-pharmacological measures as appropriate and evaluate response   Consider cultural, developmental and social influences on pain and pain management   Notify Provider if interventions unsuccessful or patient reports new pain     Problem: Infection - Adult  Goal: Absence of infection during hospitalization  Description: INTERVENTIONS:  1. Assess and monitor for signs and symptoms of infection  2. Monitor lab/diagnostic results  3. Monitor all insertion sites/wounds/incisions  4. Monitor secretions for changes in amount and color  5. Administer medications as ordered  6. Educate and encourage patient and family to use good hand hygiene technique  7. Identify and educate in appropriate isolation precautions for identified infection/condition  Outcome: Adequate for Discharge  Flowsheets (Taken 9/8/2021 0956)  Absence of infection during hospitalization:   Assess and monitor for signs and symptoms of infection   Educate and encourage patient and family to use good hand hygiene technique     Problem: Safety  Adult - Fall  Goal: Free from fall injury  Description: INTERVENTIONS:    Inpatient - Please reference Cares/Safety Flowsheet under [facility] Fall Risk for interventions.  Pediatrics - Please reference Peds Daily Cares/Safety Flowsheet under [place] Pediatric Fall Assessment Fall Bundle for interventions  LD/OB - Please reference OB Shift Screening Flowsheet under OB Fall Risk for interventions.  Outcome: Adequate for Discharge     Problem: Discharge Planning  Goal: Discharge to home or other facility with appropriate resources  Description: INTERVENTIONS:  1. Identify and discuss barriers to discharge with patient and caregiver.  2. Arrange for needed discharge resources and transportation as appropriate.  3. Identify discharge learning needs (meds, wound care, etc).  4. Arrange for interpreters to assist at discharge as needed.  5. Refer to  for coordinating discharge planning if the patient needs post-hospital services based on physician order or complex needs related to functional status, cognitive ability or social support system.  Outcome: Adequate for Discharge  Flowsheets (Taken 9/8/2021 0956)  Discharge to home or other facility with appropriate resources: Identify and discuss barriers to discharge with patient and caregiver     Problem: Alteration in the Breast  Description: i.e. Inverted or flat nipples, breast augmentation, breast reduction, etc.  Goal: Optimize infant feeding at the breast  Description: INTERVENTIONS:  1. Maximize feeding opportunities (skin to skin)  2. Assess postioning, latch, gestational age and oral anatomy  3. Assess prior breastfeeding history  4. Discourage use of pacifiers - artifical nipples  5. Educate mother on feeding cues and signs of proper latch  6. Provide latch assistance  7. Finger feeding/hand expression of EBM or sucrose to elicit suck  8. Symphony/[place] pump use  9. Order lactation consult  10. Nipple shield  11. Supplementation (EBM/formula) via  finger feed  12. Supplementation (EBM/formula) via bottle  13. Supplementation (EBM/formula) via SNS  14. Treat damaged nipples  15. Provide and reinforce breastfeeding education  Outcome: Adequate for Discharge     Problem: Inadequate Latch, Suck, or Swallow  Goal: Optimize infant feeding at the breast  Description: INTERVENTIONS:  1. Maximize feeding opportunities (skin to skin)  2. Assess postioning, latch, gestational age and oral anatomy  3. Assess prior breastfeeding history  4. Discourage use of pacifiers - artifical nipples  5. Educate mother on feeding cues and signs of proper latch  6. Provide latch assistance  7. Finger feeding/hand expression of EBM or sucrose to elicit suck  8. Symphony/[place] pump use  9. Order lactation consult  10. Nipple shield  11. Supplementation (EBM/formula) via finger feed  12. Supplementation (EBM/formula) via bottle  13. Supplementation (EBM/formula) via SNS  14. Treat damaged nipples  15. Provide and reinforce breastfeeding education  Outcome: Adequate for Discharge     Problem: Lactation Knowledge Deficit  Goal: Optimize infant feeding at the breast  Description: INTERVENTIONS:  1. Maximize feeding opportunities (skin to skin)  2. Assess postioning, latch, gestational age and oral anatomy  3. Assess prior breastfeeding history  4. Discourage use of pacifiers - artifical nipples  5. Educate mother on feeding cues and signs of proper latch  6. Provide latch assistance  7. Finger feeding/hand expression of EBM or sucrose to elicit suck  8. Symphony/[place] pump use  9. Order lactation consult  10. Nipple shield  11. Supplementation (EBM/formula) via finger feed  12. Supplementation (EBM/formula) via bottle  13. Supplementation (EBM/formula) via SNS  14. Treat damaged nipples  15. Provide and reinforce breastfeeding education  Outcome: Adequate for Discharge     Problem: Postpartum  Goal: Experiences normal postpartum course  Description: INTERVENTIONS:  1. Monitor maternal  vital signs  2. Assess uterine involution  Outcome: Adequate for Discharge  Flowsheets (Taken 2021)  Experiences normal postpartum course:   Monitor maternal vital signs   Assess uterine involution  Goal: Appropriate maternal -  bonding  Description: INTERVENTIONS:  1. Identify family support  2. Referral to  or  as needed  Outcome: Adequate for Discharge  Flowsheets (Taken 2021)  Appropriate maternal- bonding: Identify family support  Goal: Establishment of infant feeding pattern  Description: INTERVENTIONS:  1. Assess breast/bottle feeding  2. Refer to lactation as needed  Outcome: Adequate for Discharge  Flowsheets (Taken 2021)  Establishment of infant feeding pattern:   Assess breast/bottle feeding   Refer to lactation as needed  Goal: Incisions, wounds, or drain sites healing without S/S of infection  Description: INTERVENTIONS  1. Assess and document risk factors for skin breakdown  2. Assess and document skin integrity  3. Assess and document dressing/incision, wound bed, drain sites and surrounding tissue  4. Implement wound care per orders  Outcome: Adequate for Discharge  Flowsheets (Taken 2021)  Incision(s), wound(s) or drain site(s) healing without S/S of infection:   Assess and document skin integrity   Assess and document risk factors for skin breakdown

## 2021-09-08 NOTE — PLAN OF CARE
Problem: Pain - Adult  Goal: Verbalizes/displays adequate comfort level or baseline comfort level  Description: INTERVENTIONS:  1. Encourage patient to monitor pain and request interventions  2. Assess pain using the appropriate pain scale  3. Administer analgesics based on type and severity of pain and evaluate response  4. Educate/Implement non-pharmacological measures as appropriate and evaluate response  5. Consider cultural, developmental and social influences on pain and pain management  6. Notify Provider if interventions unsuccessful or patient reports new pain  Outcome: Progressing  Flowsheets (Taken 9/7/2021 2139)  Verbalizes/displays adequate comfort level or baseline comfort level:   Encourage patient to monitor pain and request interventions   Assess pain using the appropriate pain scale   Administer analgesics based on type and severity of pain and evaluate response   Educate/Implement non-pharmacological measures as appropriate and evaluate response   Notify Provider if interventions unsuccessful or patient reports new pain     Problem: Infection - Adult  Goal: Absence of infection during hospitalization  Description: INTERVENTIONS:  1. Assess and monitor for signs and symptoms of infection  2. Monitor lab/diagnostic results  3. Monitor all insertion sites/wounds/incisions  4. Monitor secretions for changes in amount and color  5. Administer medications as ordered  6. Educate and encourage patient and family to use good hand hygiene technique  7. Identify and educate in appropriate isolation precautions for identified infection/condition  Outcome: Progressing  Flowsheets (Taken 9/7/2021 2139)  Absence of infection during hospitalization: Monitor lab/diagnostic results     Problem: Safety Adult - Fall  Goal: Free from fall injury  Description: INTERVENTIONS:    Inpatient - Please reference Cares/Safety Flowsheet under [facility] Fall Risk for interventions.  Pediatrics - Please reference Peds Daily  Cares/Safety Flowsheet under [place] Pediatric Fall Assessment Fall Bundle for interventions  LD/OB - Please reference OB Shift Screening Flowsheet under OB Fall Risk for interventions.  Outcome: Progressing     Problem: Discharge Planning  Goal: Discharge to home or other facility with appropriate resources  Description: INTERVENTIONS:  1. Identify and discuss barriers to discharge with patient and caregiver.  2. Arrange for needed discharge resources and transportation as appropriate.  3. Identify discharge learning needs (meds, wound care, etc).  4. Arrange for interpreters to assist at discharge as needed.  5. Refer to  for coordinating discharge planning if the patient needs post-hospital services based on physician order or complex needs related to functional status, cognitive ability or social support system.  Outcome: Progressing  Flowsheets (Taken 9/7/2021 2139)  Discharge to home or other facility with appropriate resources: Identify discharge learning needs (meds, wound care, etc)     Problem: Alteration in the Breast  Description: i.e. Inverted or flat nipples, breast augmentation, breast reduction, etc.  Goal: Optimize infant feeding at the breast  Description: INTERVENTIONS:  1. Maximize feeding opportunities (skin to skin)  2. Assess postioning, latch, gestational age and oral anatomy  3. Assess prior breastfeeding history  4. Discourage use of pacifiers - artifical nipples  5. Educate mother on feeding cues and signs of proper latch  6. Provide latch assistance  7. Finger feeding/hand expression of EBM or sucrose to elicit suck  8. Symphony/[place] pump use  9. Order lactation consult  10. Nipple shield  11. Supplementation (EBM/formula) via finger feed  12. Supplementation (EBM/formula) via bottle  13. Supplementation (EBM/formula) via SNS  14. Treat damaged nipples  15. Provide and reinforce breastfeeding education  Outcome: Progressing  Flowsheets (Taken 9/7/2021 2139)  Optimize  infant feeding at the breast: Maximize feeding opportunities (skin to skin)     Problem: Inadequate Latch, Suck, or Swallow  Goal: Optimize infant feeding at the breast  Description: INTERVENTIONS:  1. Maximize feeding opportunities (skin to skin)  2. Assess postioning, latch, gestational age and oral anatomy  3. Assess prior breastfeeding history  4. Discourage use of pacifiers - artifical nipples  5. Educate mother on feeding cues and signs of proper latch  6. Provide latch assistance  7. Finger feeding/hand expression of EBM or sucrose to elicit suck  8. Symphony/[place] pump use  9. Order lactation consult  10. Nipple shield  11. Supplementation (EBM/formula) via finger feed  12. Supplementation (EBM/formula) via bottle  13. Supplementation (EBM/formula) via SNS  14. Treat damaged nipples  15. Provide and reinforce breastfeeding education  Outcome: Progressing  Flowsheets (Taken 9/7/2021 2139)  Optimize infant feeding at the breast: Maximize feeding opportunities (skin to skin)     Problem: Lactation Knowledge Deficit  Goal: Optimize infant feeding at the breast  Description: INTERVENTIONS:  1. Maximize feeding opportunities (skin to skin)  2. Assess postioning, latch, gestational age and oral anatomy  3. Assess prior breastfeeding history  4. Discourage use of pacifiers - artifical nipples  5. Educate mother on feeding cues and signs of proper latch  6. Provide latch assistance  7. Finger feeding/hand expression of EBM or sucrose to elicit suck  8. Symphony/[place] pump use  9. Order lactation consult  10. Nipple shield  11. Supplementation (EBM/formula) via finger feed  12. Supplementation (EBM/formula) via bottle  13. Supplementation (EBM/formula) via SNS  14. Treat damaged nipples  15. Provide and reinforce breastfeeding education  Outcome: Progressing  Flowsheets (Taken 9/7/2021 2139)  Optimize infant feeding at the breast: Maximize feeding opportunities (skin to skin)     Problem: Postpartum  Goal:  Experiences normal postpartum course  Description: INTERVENTIONS:  1. Monitor maternal vital signs  2. Assess uterine involution  Outcome: Progressing  Flowsheets (Taken 2021)  Experiences normal postpartum course:   Monitor maternal vital signs   Assess uterine involution  Goal: Appropriate maternal -  bonding  Description: INTERVENTIONS:  1. Identify family support  2. Referral to  or  as needed  Outcome: Progressing  Flowsheets (Taken 2021)  Appropriate maternal- bonding: Identify family support  Goal: Establishment of infant feeding pattern  Description: INTERVENTIONS:  1. Assess breast/bottle feeding  2. Refer to lactation as needed  Outcome: Progressing  Flowsheets (Taken 2021)  Establishment of infant feeding pattern: Assess breast/bottle feeding  Goal: Incisions, wounds, or drain sites healing without S/S of infection  Description: INTERVENTIONS  1. Assess and document risk factors for skin breakdown  2. Assess and document skin integrity  3. Assess and document dressing/incision, wound bed, drain sites and surrounding tissue  4. Implement wound care per orders  Outcome: Progressing  Flowsheets (Taken 2021)  Incision(s), wound(s) or drain site(s) healing without S/S of infection: Assess and document skin integrity

## 2021-09-08 NOTE — DISCHARGE INSTRUCTIONS
1. Pelvic Rest (no tampons or intercourse) x 6 weeks  2. Avoid deep bathing and public sources of water x 6 weeks  3. Return to clinic or ER for Temp >100.4F, signs or symptoms of infection, or worsening pelvic pain.  4. Encourage ambulation and increase intake of water (2-3 quarts per day) for first week  5. No driving while using pain medications  6. Lifting restrictions of the baby for 2 weeks postpartum  7. Prenatal vitamins daily for 3 months or until done breastfeeding, whichever lasts longer  8. Notify office for crying spells beyond 2 weeks postpartum.  9. Follow up 6 week postpartum visit      Postpartum Discharge Instructions  Breast-Feeding  • Feed your baby on demand.  Breastfeeding every 1 ½ -3 hours is normal. You create more milk by breastfeeding more often  • Drink water when thirsty.  Eat a balanced diet.  Most things you eat and drink enter the breast milk including caffeine, alcohol, and medicines.  • The most common cause of pain with breastfeeding is because the baby is not latched on right.  See the Celebrate the Family Book, for more information.  • Call [facility] [phone] if you have any breastfeeding questions or concerns.  • Breast-milk may be stored in a refrigerator for up to 3 days and in a freezer for 3-6 months.  Do not use a microwave to thaw frozen milk.  Using warm water for thawing is the best way.  • Call your provider if you have red, tender areas on your breast along with a fever.      Bottle-Feeding  • Breast Care:  Wear a comfortable, supportive bra.  Use pain medicines (Acetaminophen) as needed to relieve swelling and pressure.  Ice packs may be put on the breasts 4 times a day for 15 minutes each time for comfort. Avoid breast stimulation such as hot showers directly on the breasts or expressing milk.  • Rest and relax when the baby sleeps, at least 2 times a day  • Do not heat formula in the microwave ---it may cause severe burns  • Feed the baby on  demand.   Hold the baby in your arms when feeding the baby.  After every ½ to 1 ounce or more the baby drinks, burp the baby.  Never leave the baby with a propped bottle.  If your baby does not finish the whole bottle, throw it away the unused formula.      Episiotomy/Perineal Care  • Watch for increased soreness, drainage from episiotomy and stitches coming apart.  • Soak in a warm tub 2-3 times a day until you longer feel pain.  • Use good hand washing at all times.  Wipe perineal area from front to back to prevent infection  • No tampons, swimming, hot tubs, or douching for 4-6 weeks  • Use medicines as directed for constipation      Sexual Activity  • No sex for 6 weeks  • When you resume sex, use water-soluble jelly (K-Y) or contraceptive cream if you notice a need for more vaginal lubrication  • Breast-feeding is not a form of birth control.  • When you resume sex, foams and condoms are a good form of birth control and prevent sexually transmitted diseases, when used right.  • Follow your providers suggestions for birth control.          Diet  • Eat a well-balanced diet; choose from all food groups for each meal (milk or dairy, meat, fruits, vegetables, grains.)      When to call your provider  • Heavy or bright red bleeding (more than 1 pad per hour) not related to increased activity.  Change in bleeding back to dark red clots.  • Bad smell to the vaginal discharge  • If you have pain, burning, trouble, or frequency with urination  • Lower back pain  • Chills, fever above 100.4 degrees F, Feeling like you have the flu, or increased stomach tenderness or pain  • Pain, redness, swelling, increased warmth in calf or any part of the leg.  • Sadness or blues lasting longer than 2 weeks or thoughts of hurting yourself or the baby

## 2021-09-08 NOTE — DISCHARGE SUMMARY
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 Postpartum care     History of Present Illness  Please see H&P.      Subjective   Patient is doing well today. Lochia is minimal. Patient is tolerating a regular diet. She is ambulating. Pain is well controlled. Patient is passing flatus and has not had a BM. Patient is urinating without difficulty. She is breast feeding well.     Objective   Temp:  [36.3 °C (97.3 °F)-36.6 °C (97.9 °F)] 36.3 °C (97.3 °F)  Heart Rate:  [58-84] 58  Resp:  [18] 18  BP: (119-130)/(71-76) 119/76  Physical Exam    General:  Alert, cooperative, no distress   Abdomen: Bowel sounds present. Soft, appropriate fundal tenderness, non-distended. Fundus firm at umbilicus. Incision: not applicable, (vaginal delivery).    Pelvic Deferred   Extremities: Negative Christina's sign bilaterally.  Edema tr.  DTRs normal.     Labs  Recent Results (from the past 24 hour(s))   N. gonorrhoeae, RNA    Collection Time: 09/07/21 11:47 AM    Specimen: Urine, First 20-30mL of Void   Result Value Ref Range    N gonorrhoeae, RNA Positive (A) Negative   C. trachomatis, RNA    Collection Time: 09/07/21 11:47 AM    Specimen: Urine, First 20-30mL of Void   Result Value Ref Range    Chlamydia, RNA Positive (A) Negative       Assessment/Plan       Diet   regular diet    Discharge Medications     Medication List      START taking these medications    ibuprofen 800  mg tablet  Commonly known as: ADVIL,MOTRIN  Take 1 tablet (800 mg total) by mouth every 8 (eight) hours as needed for pain scale 1-3/10 for up to 10 days           CONTINUE taking these medications    sertraline 50 mg tablet  Commonly known as: ZOLOFT        valACYclovir 500 mg tablet  Commonly known as: VALTREX           STOP taking these medications    lurasidone 40 mg tablet  Commonly known as: LATUDA        traZODone 50 mg tablet  Commonly known as: DESYREL                 Discharge Plan   1. Pelvic Rest (no tampons or intercourse) x 6 weeks  2. Avoid deep bathing and public sources of water x 6 weeks  3. Return to clinic or ER for Temp >100.4F, signs or symptoms of infection, or worsening pelvic pain.  4. Encourage ambulation and increase intake of water (2-3 quarts per day) for first week  5. No driving while using pain medications  6. Lifting restrictions of the baby for 2 weeks postpartum  7. Prenatal vitamins daily for 3 months or until done breastfeeding, whichever lasts longer  8. Notify office for crying spells beyond 2 weeks postpartum.  9. Follow up 6 week postpartum visit    Condition of Patient at Discharge  Stable    Time spent with patient/Coordination of care: 20 minutes  -----------------------------  Martin Bailey MD  09/08/21 9:10 AM

## 2022-06-25 ENCOUNTER — HOSPITAL ENCOUNTER (EMERGENCY)
Dept: GENERAL RADIOLOGY | Age: 22
Discharge: HOME | End: 2022-06-28

## 2022-06-25 ENCOUNTER — HOSPITAL ENCOUNTER (EMERGENCY)
Age: 22
Discharge: HOME | End: 2022-06-25
Attending: EMERGENCY MEDICINE

## 2022-06-25 VITALS
SYSTOLIC BLOOD PRESSURE: 135 MMHG | RESPIRATION RATE: 16 BRPM | OXYGEN SATURATION: 97 % | BODY MASS INDEX: 25.66 KG/M2 | WEIGHT: 154 LBS | TEMPERATURE: 97.7 F | HEIGHT: 65 IN | DIASTOLIC BLOOD PRESSURE: 74 MMHG | HEART RATE: 92 BPM

## 2022-06-25 DIAGNOSIS — S71.111A LACERATION OF RIGHT THIGH, INITIAL ENCOUNTER: Primary | ICD-10-CM

## 2022-06-25 PROCEDURE — 99283 EMERGENCY DEPT VISIT LOW MDM: CPT

## 2022-06-25 PROCEDURE — 73552 X-RAY EXAM OF FEMUR 2/>: CPT

## 2022-06-25 PROCEDURE — 12002 RPR S/N/AX/GEN/TRNK2.6-7.5CM: CPT

## 2022-06-25 RX ORDER — CEPHALEXIN 500 MG/1
500 CAPSULE ORAL 2 TIMES DAILY
Qty: 10 CAPSULE | Refills: 0 | Status: SHIPPED | OUTPATIENT
Start: 2022-06-25 | End: 2022-06-30

## 2022-06-25 ASSESSMENT — PAIN DESCRIPTION - PAIN TYPE: TYPE: ACUTE PAIN

## 2022-06-25 ASSESSMENT — PAIN - FUNCTIONAL ASSESSMENT
PAIN_FUNCTIONAL_ASSESSMENT: 0-10
PAIN_FUNCTIONAL_ASSESSMENT: PREVENTS OR INTERFERES SOME ACTIVE ACTIVITIES AND ADLS

## 2022-06-25 ASSESSMENT — ENCOUNTER SYMPTOMS: SHORTNESS OF BREATH: 0

## 2022-06-25 ASSESSMENT — PAIN DESCRIPTION - ORIENTATION: ORIENTATION: RIGHT

## 2022-06-25 ASSESSMENT — PAIN DESCRIPTION - DESCRIPTORS: DESCRIPTORS: ACHING

## 2022-06-25 ASSESSMENT — PAIN DESCRIPTION - LOCATION: LOCATION: LEG

## 2022-06-25 ASSESSMENT — PAIN SCALES - GENERAL: PAINLEVEL_OUTOF10: 2

## 2022-06-25 NOTE — ED TRIAGE NOTES
Pt states she was cleaning the bathroom and was cut by a broken porcelain soap dish today on the right outer thigh area and may need sutures. Bleeding is controlled PTA.

## 2022-06-25 NOTE — ED PROVIDER NOTES
[facility] Emergency Department Provider Note                   PCP:                None Provider               Age: 22 y.o. Sex: female       ICD-10-CM    1. Laceration of right thigh, initial encounter  S71.111A        DISPOSITION         New Prescriptions    CEPHALEXIN (KEFLEX) 500 MG CAPSULE    Take 1 capsule by mouth 2 times daily for 5 days       Orders Placed This Encounter   Procedures    LACERATION REPAIR    XR FEMUR RIGHT (MIN 2 VIEWS)    Apply dressing        SUKHDEV Pulido 7:45 PM      MDM  Number of Diagnoses or Management Options  Laceration of right thigh, initial encounter  Diagnosis management comments: In summary this is a well-appearing 81-year-old female presenting today for laceration to the right lateral thigh. Her tetanus is up-to-date. There is no evidence of foreign body on gross visualization or on x-ray. The wound was repaired with 5 simple interrupted sutures, these will need to be removed in about 10 to 12 days. Discussed wound care with patient. Will place patient on short course of antibiotics to prevent secondary infection. Amount and/or Complexity of Data Reviewed  Tests in the radiology section of CPT®: ordered and reviewed    Patient Progress  Patient progress: rukhsana Nuñez is a 22 y.o. female who presents to the Emergency Department with chief complaint of    Chief Complaint   Patient presents with    Laceration      81-year-old well-appearing female presents today for evaluation of laceration to the right lateral thigh. She states that she was cleaning the bathroom and there was a broken ceramic soap dish that cut the lateral aspect of her leg. She states she did clean the wound at home with Betadine and attempted to use a butterfly bandage but due to the gaping nature of the wound she came here because she was concerned she needed sutures. She denies any numbness or tingling distally, no ongoing bleeding.   She is up-to-date on her tetanus shot. No other acute complaints today. The history is provided by the patient. No  was used. Review of Systems   Constitutional: Negative for activity change. Respiratory: Negative for shortness of breath. Cardiovascular: Negative for chest pain and leg swelling. Musculoskeletal: Negative for gait problem and joint swelling. Skin: Positive for wound (laceration to the right thigh). Neurological: Negative for weakness and numbness. Hematological: Does not bruise/bleed easily. No past medical history on file. No past surgical history on file. No family history on file. Social Connections:     Frequency of Communication with Friends and Family: Not on file    Frequency of Social Gatherings with Friends and Family: Not on file    Attends Sikhism Services: Not on file    Active Member of Clubs or Organizations: Not on file    Attends Club or Organization Meetings: Not on file    Marital Status: Not on file        No Known Allergies     Vitals signs and nursing note reviewed. Patient Vitals for the past 4 hrs:   Temp Pulse Resp BP SpO2   06/25/22 1843 97.7 °F (36.5 °C) (!) 102 16 135/74 97 %          Physical Exam  Vitals and nursing note reviewed. Constitutional:       General: She is not in acute distress. Appearance: Normal appearance. HENT:      Head: Normocephalic and atraumatic. Eyes:      Conjunctiva/sclera: Conjunctivae normal.   Pulmonary:      Effort: Pulmonary effort is normal.   Musculoskeletal:      Right hip: No tenderness. Normal range of motion. Right knee: Normal range of motion. No tenderness. Skin:     General: Skin is warm and dry. Capillary Refill: Capillary refill takes less than 2 seconds. Neurological:      General: No focal deficit present. Mental Status: She is alert and oriented to person, place, and time. Sensory: No sensory deficit.       Gait: Gait normal. Psychiatric:         Mood and Affect: Mood normal.         Thought Content: Thought content normal.         Judgment: Judgment normal.          Lac Repair    Date/Time: 6/25/2022 7:43 PM  Performed by: SUKHDEV Turpin  Authorized by: Olimpia Menard MD     Consent:     Consent obtained:  Verbal    Consent given by:  Patient    Risks discussed:  Infection, poor cosmetic result and retained foreign body    Alternatives discussed:  Referral  Anesthesia (see MAR for exact dosages): Anesthesia method:  Local infiltration    Local anesthetic:  Lidocaine 1% WITH epi  Laceration details:     Location:  Leg    Leg location:  R upper leg    Length (cm):  4    Depth (mm):  1  Repair type:     Repair type:  Simple  Pre-procedure details:     Preparation:  Patient was prepped and draped in usual sterile fashion  Exploration:     Wound exploration: wound explored through full range of motion      Wound extent: no foreign bodies/material noted and no tendon damage noted      Contaminated: no    Treatment:     Area cleansed with:  Betadine and saline    Irrigation solution:  Sterile saline    Irrigation method:  Syringe    Visualized foreign bodies/material removed: no    Skin repair:     Repair method:  Sutures    Suture size:  4-0    Suture material:  Nylon    Suture technique:  Simple interrupted    Number of sutures:  5  Approximation:     Approximation:  Close  Post-procedure details:     Dressing:  Open (no dressing)    Patient tolerance of procedure: Tolerated well, no immediate complications          Labs Reviewed - No data to display     XR FEMUR RIGHT (MIN 2 VIEWS)   Final Result   No acute osseous abnormality. ED Course as of 06/25/22 1945   Sat Jun 25, 2022 1919   Clinical indication: Acute severe pain and swelling of lateral right femur,  laceration injury, bleeding, possible foreign body     Comparison: none     Technique: AP and lateral views     Findings:  There is no evidence of fracture, dislocation, or erosion. There is  soft tissue swelling. No radiopaque foreign body is evident by radiograph.        IMPRESSION:  No acute osseous abnormality. [KE]      ED Course User Index  [KE] SUKHDEV Sanchez        Voice dictation software was used during the making of this note. This software is not perfect and grammatical and other typographical errors may be present. This note has not been completely proofread for errors.      Nicole Riggs, [id number]  06/25/22 1945

## 2022-07-11 ENCOUNTER — HOSPITAL ENCOUNTER (EMERGENCY)
Age: 22
Discharge: HOME | End: 2022-07-11
Attending: EMERGENCY MEDICINE

## 2022-07-11 VITALS
RESPIRATION RATE: 16 BRPM | HEIGHT: 65 IN | SYSTOLIC BLOOD PRESSURE: 136 MMHG | WEIGHT: 155 LBS | TEMPERATURE: 98.3 F | HEART RATE: 76 BPM | BODY MASS INDEX: 25.83 KG/M2 | OXYGEN SATURATION: 97 % | DIASTOLIC BLOOD PRESSURE: 74 MMHG

## 2022-07-11 DIAGNOSIS — Z48.02 VISIT FOR SUTURE REMOVAL: Primary | ICD-10-CM

## 2022-07-11 PROCEDURE — 4500000002 HC ER NO CHARGE

## 2022-07-11 ASSESSMENT — PAIN - FUNCTIONAL ASSESSMENT: PAIN_FUNCTIONAL_ASSESSMENT: NONE - DENIES PAIN

## 2022-07-11 NOTE — ED NOTES
I have reviewed discharge instructions with the patient. The patient verbalized understanding. Patient left ED via Discharge Method: ambulatory to Home with self. Opportunity for questions and clarification provided. Patient given 0 scripts.           Felicia Kate RN  07/11/22 [id number]

## 2022-07-11 NOTE — ED PROVIDER NOTES
[facility] Emergency Department Provider Note                   PCP:                None Provider               Age: 22 y.o. Sex: female       ICD-10-CM    1. Visit for suture removal  Z48.02        DISPOSITION Decision To Discharge 07/11/2022 05:33:39 PM       New Prescriptions    No medications on file       No orders of the defined types were placed in this encounter. Virgilio Bacon is a 22 y.o. female who presents to the Emergency Department with chief complaint of    Chief Complaint   Patient presents with    Suture / Staple Removal      Patient to ER for suture removal from the right thigh. They have been there for the past 2 weeks. She has no complaints          Review of Systems   All other systems reviewed and are negative. All other systems reviewed and are negative. History reviewed. No pertinent past medical history. History reviewed. No pertinent surgical history. History reviewed. No pertinent family history. Social Connections:     Frequency of Communication with Friends and Family: Not on file    Frequency of Social Gatherings with Friends and Family: Not on file    Attends Yazidi Services: Not on file    Active Member of Clubs or Organizations: Not on file    Attends Club or Organization Meetings: Not on file    Marital Status: Not on file        No Known Allergies     Vitals signs and nursing note reviewed. Patient Vitals for the past 4 hrs:   Temp Pulse Resp BP SpO2   07/11/22 1730 98.3 °F (36.8 °C) 76 16 136/74 97 %          Physical Exam  Vitals and nursing note reviewed. Constitutional:       Appearance: Normal appearance. She is normal weight. HENT:      Head: Normocephalic and atraumatic. Right Ear: External ear normal.      Left Ear: External ear normal.      Nose: Nose normal.      Mouth/Throat:      Mouth: Mucous membranes are moist.      Pharynx: Oropharynx is clear. Eyes:      Extraocular Movements: Extraocular movements intact. Pupils: Pupils are equal, round, and reactive to light. Cardiovascular:      Rate and Rhythm: Normal rate and regular rhythm. Pulses: Normal pulses. Heart sounds: Normal heart sounds. Pulmonary:      Effort: Pulmonary effort is normal.      Breath sounds: Normal breath sounds. No rales. Chest:      Chest wall: No tenderness. Abdominal:      Tenderness: There is no abdominal tenderness. Hernia: No hernia is present. Musculoskeletal:         General: Normal range of motion. Cervical back: Normal range of motion and neck supple. Comments: Healed laceration right lateral thigh 5 sutures are in place no drainage   Skin:     General: Skin is warm and dry. Neurological:      General: No focal deficit present. Mental Status: She is alert. Psychiatric:         Mood and Affect: Mood normal.         Behavior: Behavior normal.          MDM  Number of Diagnoses or Management Options  Diagnosis management comments: 2-week status post right thigh laceration with healed wound. Sutures were removed without complication       Amount and/or Complexity of Data Reviewed  Review and summarize past medical records: yes    Risk of Complications, Morbidity, and/or Mortality  Presenting problems: minimal  Diagnostic procedures: minimal  Management options: minimal    Patient Progress  Patient progress: improved      Procedures    Labs Reviewed - No data to display     No orders to display            [place] Coma Scale  Eye Opening: Spontaneous  Best Verbal Response: Oriented  Best Motor Response: Obeys commands  [place] Coma Scale Score: 15                     Voice dictation software was used during the making of this note. This software is not perfect and grammatical and other typographical errors may be present. This note has not been completely proofread for errors.      Ron Tovar, PA  07/11/22 [address] [facility] [place]  07/11/22 0584

## 2023-04-30 NOTE — H&P
Ob History and Physical    Chief complaint: Postpartum care    HPI  Marisa Gates is a 20 y.o. female with an Estimated Date of Delivery: 21. She is currently a 37w1d  who is being admitted for postpartum care. 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  She was started on Valtrex 500 mg qd.  . Patient presented to [facility] and delivered female infant, APGAR 9/9, weight 6#.  She received 10 units Pitocin IM.  GBS status was positive and she did not received prophylaxis.  Patient and infant transferred to [facility] by ambulance.  Placenta examined by Dr Bailey and appears intact, multiple calcified cotyledons. Prenatal care in [facility] but records unavailable for review due to holiday closure.      Mom Prenatal Results    Prenatal Results    1st Trimester     Test Value Reference Range Date Time    Hgb        Hct        Platelet Count        Urine culture        Gonorrhea *        Chlamydia *        Syphilis Ab        Rubella *        HBsAg *        HIV *        ABO (Prenatal)        Rh (Prenatal)        Antibody screen (Prenatal)        Pap smear          2nd Trimester     Test Value Reference Range Date Time    Hgb  12.1 g/dL 11.5 - 15.5 21 1049    Hct  36.7 % 34.0 - 45.0 21 1049    Platelet Count  267 10*3/uL 140 - 350 21 1049    Syphilis Ab        1Hr GTT        Antibody screen (Prenatal)          3rd Trimester     Test Value Reference Range Date Time    GBS        GBS (discrete)*        Syphilis Ab        FFN          3 Hr GTT     Test Value Reference Range Date Time    Fasting Glucose        1 Hr Glucose        2 Hr Glucose        3 Hr Glucose          COVID-19     Test Value Reference Range Date Time    COVID-19 *           Non [facility] Type and Screen Results     Test Value Reference Range Date Time    ABO *        Rh *        Antibody Screen - 1st Trimester *        Antibody Screen >14 weeks          RPR 1st/2nd Trimester  Physical Therapy    Visit Type: treatment  Precautions:  Medical precautions:  fall risk; standard precautions.    Lines:     Basic: IV and telemetry      Lines in chart and on patient reviewed, precautions maintained throughout session.  Safety Measures: bed alarm and chair alarm  SUBJECTIVE  Patient agreed to participate in therapy this date.  \"My wife can be with me all the time.\"  Patient / Family Goal: maximize function and return home    Pain     Location: Groin region  RN informed on pain level  Face, Legs, Activity, Cry, Consolability Scale (FLACC)     Face: 1 - Occasional grimace or frown, withdrawn, disinterested    Legs: 0 - Normal position or relaxed    Activity: 0 - Lying quietly, normal position, moves easily    Cry: 0 - No cry (awake or asleep)    Consolability: 0 - Content, relaxed    Score: 1     OBJECTIVE     Cognitive Status   Orientation    - Oriented to: person, place, time and situation  Functional Communication   - Overall Status: within functional limits   - Forms of Communication: verbal  Attention Span    - Attention: intact  Following Direction   - follows one step commands consistently and follows one step commands with increased time  Memory   - intact  Safety Awareness/Insight   - intact      Sitting Balance  (OZIEL = base of support)  Static      - Trial 1 details: supervision    Standing Balance  (OZIEL = base of support)  Firm Surface: Double Leg      - Static, Eyes Open       - Trial 1 details: with double UE support and contact guard (RW)       Transfers  Assistive devices: gait belt, 2-wheeled walker  - Sit to stand: minimal assist, with verbal cues  - Stand to sit: contact guard/touching/steadying assist, with verbal cues    Ambulation / Gait  - Assistive device: gait belt and two-wheeled walker  - Distance (feet unless otherwise indicated): 3, 12  - Assist Level: contact guard/touching/steadying assist and minimal assist  - Surface: even  - Description: decreased jer/pace,  (Non [facility] Results)     Test Value Reference Range Date Time    RPR (1st Trimester)*        RPR (2nd Trimester)          [facility] Type and Screen Results (Inpatient)     Test Value Reference Range Date Time    ABO (Inpatient)  (See Report)    21 1049    Rh (Inpatient)  (See Report)    21 1049    Antibody Screen (Inpatient)  (See Report)    21 1049      Congenital Disease Screening     Test Value Reference Range Date Time    Quad Screen        Thalassemia        Cystic Fibrosis        Sickle Cell        Prenatal Screen (NIPS/Counsyl)        Carrier Screening (Counsyl)          TORCH     Test Value Reference Range Date Time    TORCH IgG        TORCH IgM          Other     Test Value Reference Range Date Time    24h Urine Protein        Urine Toxicology Screen  (See Report)    19 1203    TSH  0.146 uIU/ml 0.470 - 3.410 19 1030    A1C          * REQUIRES MANUAL ENTRY IF EXTERNAL LAB RESULTS ARE AVAILABLE       Legend    ^: Historical                OB History    Para Term  AB Living   1             SAB TAB Ectopic Molar Multiple Live Births                    # Outcome Date GA Lbr Caesar/2nd Weight Sex Delivery Anes PTL Lv   1 Current              Past Medical History:   Diagnosis Date   • Depression      Past Surgical History:   Procedure Laterality Date   • MOUTH SURGERY         History reviewed. No pertinent family history.  No Known Allergies  Medications Prior to Admission   Medication Sig   • valACYclovir (VALTREX) 500 mg tablet Take 500 mg by mouth 2 (two) times a day Indications: genital herpes   • sertraline (ZOLOFT) 50 mg tablet Take 50 mg by mouth daily Indications: anxiousness associated with depression   • lurasidone (LATUDA) 40 mg tablet Take 1 tablet (40 mg total) by mouth every evening   • traZODone (DESYREL) 50 mg tablet Take 50 mg by mouth nightly       Current Facility-Administered Medications:   •  sertraline (ZOLOFT) tablet 50 mg, 50 mg,  shuffling and heavy reliance on BUE     Interventions     Training provided: activity tolerance, balance retraining, bed mobility training, gait training, safety training, transfer training, use of assistive device and functional ambulation    Skilled input: Verbal instruction/cues and tactile instruction/cues  Verbal Consent: Writer verbally educated and received verbal consent for hand placement, positioning of patient, and techniques to be performed today from patient for clothing adjustments for techniques and therapist position for techniques as described above and how they are pertinent to the patient's plan of care.         ASSESSMENT   Impairments: range of motion, strength, balance deficits, pain, activity tolerance, endurance and balance  Functional Limitations: all functional mobility  RN gave approval for PT treatment in the room due to low hgb (6.9) about to get PRBC. Pt eager to work with therapy. Pt ambulated with rolling walker and contact guard assist-min A. PT to continue to follow.  Recommended Consults: PT: Physical Medicine and Rehabilitation Physician    Discharge Recommendations  Recommendation for Discharge: PT IL: Patient is appropriate for intensive daily Physical Therapy with the oversight of a Physical Medicine and Rehabilitation physician  PT/OT Mobility Equipment for Discharge: TBD  PT Identified Barriers to Discharge: Activity tolerance, medical needs.      Progress: improving as expected    • Skilled therapy is required to address these limitations in attempt to maximize the patient's independence.    Education:   Education provided during session:  - Results of above outlined education: Demonstrates understanding and Verbalizes understanding    Patient at End of Session:   Location: in chair  Safety measures: alarm system in place/re-engaged, call light within reach, lines intact and equipment intact  Handoff to: nurse    PLAN   Suggestions for next session as indicated: PT  Frequency: 6-7 x per week  Frequency Comments: Thurs, 2/6-7x, LS 4/30, PMR consult (LD)        Interventions: balance, bed mobility, endurance training, energy conservation, equipment eval/education, functional transfer training, gait training, HEP train/position, neuromuscular re-education, patient/family training, ROM, strengthening, safety education and stairs retraining  Agreement to plan and goals: patient agrees with goals and treatment plan        GOALS  Review Date: 5/4/2023  Long Term Goals: (to be met by time of discharge from hospital)  Sit to supine: Patient will complete sit to supine modified independent.  Status: progressing/ongoing  Supine to sit: Patient will complete supine to sit modified independent.  Status: progressing/ongoing  Sit to stand: Patient will complete sit to stand transfer with 2-wheeled walker, modified independent.   Status: progressing/ongoing  Stand to sit: Patient will complete stand to sit transfer with 2-wheeled walker, modified independent.   Status: progressing/ongoing  Ambulation (even): Patient will ambulate on even surface for 50 feet with 2-wheeled walker, supervision.   Status: progressing/ongoing    Documented in the chart in the following areas: Assessment.      Therapy procedure time and total treatment time can be found documented on the Time Entry flowsheet   oral, Daily, Martin Bailey MD  •  valACYclovir (VALTREX) tablet 500 mg, 500 mg, oral, 2x daily, Martin Bailey MD  •  ibuprofen (ADVIL,MOTRIN) tablet 800 mg, 800 mg, oral, q8h SANJIV, Martin Bailey MD  •  acetaminophen (TYLENOL) tablet 500-1,000 mg, 500-1,000 mg, oral, q4h PRN, Martin Bailey MD  •  prenatal multivitamin (NATAFORT) tablet 1 tablet, 1 tablet, oral, Daily, Martin Bailey MD  •  docusate sodium (COLACE) capsule 100 mg, 100 mg, oral, 2x daily, Martin Bailey MD  •  calcium carbonate (TUMS) chewable tablet 750 mg, 750 mg, oral, 3x daily PRN, Martin Bailey MD  •  measles, mumps and rubella (MMR) vaccine 0.5 mL, 0.5 mL, subcutaneous, Once, Martin Bailey MD  •  diphth,pertus(acell),tetanus (BOOSTRIX) vaccine 0.5 mL, 0.5 mL, intramuscular, Once, Martin Bailey MD  •  benzocaine-menthoL (DERMOPLAST) 20-0.5 % topical spray 1 spray, 1 spray, Topical, PRN, Martin Bailey MD  •  glycerin-witch hazeL (TUCKS) medicated pads 1 application, 1 application, Topical, PRN, Martin Bailey MD    Review of Systems:  Pertinent items are noted in HPI.    Objective     Vital signs:  /88 (BP Location: Left arm, Patient Position: Sitting, Cuff Size: Regular Adult)   Pulse 62   Temp 36.5 °C (97.7 °F) (Oral)   Resp 17   SpO2 97%   Breastfeeding Yes     Maternal Type & Screen     Maternal Type & Screen ( Results)     Test Value Date Time    ABO ( Results)  O  09/06/21 1049    Rh ( Results)  POS  09/06/21 1049    Antibody Screen ( Results)  NEG  09/06/21 1049          Legend    ^: Historical                    Prenatal Results     Maternal Prenatal Labs     Test Value Date Time    Gonorrhea        Chlamydia        Syphilis Ab       RPR        Rubella       HBsAg        HIV       GBS             Legend    ^: Historical                      Physical Exam  General:   Alert, oriented, no acute distress   Skin: Normal   Lungs:  Clear to auscultation bilaterally   Heart:  Regular rate and rhythm   Abdomen:  Fundus firm and  nontender at umbilicus.     Extremities:  Negative Christina's bilaterally. Patellar DTRs 2+. Edema tr      Pelvis/Cervix:  Normal external female genitalia.  Bilateral superficial lacerations noted on labia, no active bleeding.  Locia rubra minimal.  .               Recent Results (from the past 24 hour(s))   CBC Blood, Venous    Collection Time: 09/06/21 10:49 AM   Result Value Ref Range    WBC 16.4 (H) 4.5 - 10.5 10*3/uL    RBC 4.14 3.70 - 5.30 10*6/µL    Hemoglobin 12.1 11.5 - 15.5 g/dL    Hematocrit 36.7 34.0 - 45.0 %    MCV 88.6 81.0 - 97.0 fL    MCH 29.3 28.0 - 33.0 pg    MCHC 33.1 32.0 - 36.0 g/dL    RDW 16.5 (H) 11.5 - 14.0 %    Platelets 267 140 - 350 10*3/uL    MPV 8.2 6.9 - 10.8 fL   Type and screen    Collection Time: 09/06/21 10:49 AM   Result Value Ref Range    ABO Type O     Rh Type POS     Antibody Screen NEG    Second/Confirm ABO/RH Type (Separate draw)    Collection Time: 09/06/21 10:49 AM   Result Value Ref Range    Confirmatory ABO/RH performed HOLD        Assessment/Plan      Active Problems:    Postpartum care and examination immediately after delivery    37 weeks gestation of pregnancy    Herpes virus infection in mother during third trimester of pregnancy    Patient appears stable.  With unknown GBS status and recent HSV infection, pediatrics to determine if infant will require extended stay.  Repeat CBC in morning.  Routine postpartum care.  Patient counseled about placental findings and possible result of daily cannabis and tobacco smoking.  [facility] records reviewed.  Will attempt to obtain GBS results from [facility] tomorrow.    45 minutes  _____________________________  Martin Bailey MD  09/06/21 1:08 PM